# Patient Record
Sex: FEMALE | Race: WHITE | HISPANIC OR LATINO | URBAN - METROPOLITAN AREA
[De-identification: names, ages, dates, MRNs, and addresses within clinical notes are randomized per-mention and may not be internally consistent; named-entity substitution may affect disease eponyms.]

---

## 2022-04-18 ENCOUNTER — OFFICE (OUTPATIENT)
Dept: URBAN - METROPOLITAN AREA CLINIC 79 | Facility: CLINIC | Age: 32
End: 2022-04-18

## 2022-04-18 VITALS
SYSTOLIC BLOOD PRESSURE: 106 MMHG | WEIGHT: 176 LBS | DIASTOLIC BLOOD PRESSURE: 68 MMHG | TEMPERATURE: 97.6 F | HEART RATE: 89 BPM

## 2022-04-18 DIAGNOSIS — R19.8 OTHER SPECIFIED SYMPTOMS AND SIGNS INVOLVING THE DIGESTIVE S: ICD-10-CM

## 2022-04-18 PROCEDURE — 99204 OFFICE O/P NEW MOD 45 MIN: CPT | Performed by: INTERNAL MEDICINE

## 2022-07-21 ENCOUNTER — TELEHEALTH PROVIDED IN PATIENT'S HOME (OUTPATIENT)
Dept: URBAN - METROPOLITAN AREA TELEHEALTH 3 | Facility: TELEHEALTH | Age: 32
End: 2022-07-21

## 2022-07-21 VITALS — WEIGHT: 160 LBS | HEIGHT: 64 IN

## 2022-07-21 DIAGNOSIS — R19.8 OTHER SPECIFIED SYMPTOMS AND SIGNS INVOLVING THE DIGESTIVE S: ICD-10-CM

## 2022-07-21 PROCEDURE — 99214 OFFICE O/P EST MOD 30 MIN: CPT | Mod: 95 | Performed by: INTERNAL MEDICINE

## 2022-07-21 NOTE — SERVICEHPINOTES
PATIENT VERIFIED BY DATE OF BIRTH AND NAME. Patient has been consented for this telecommunication visit.
br 
Last visit 4/18 for irregular BMs. Labs notable for elevated CRP, normal ESR, celiac panel and fecal calp
brShe continues to have occasional bloating diarrhea, triggered with stress and certain foods such as cashew butter
brRemoval of dairy from her diet has helped. Otherwise doing somewhat better from last visit.
br
anthony 
Fam hx: bother with Crohn's dx at age 32br
Yolanda 10 point review of systems have been reviewed as per HPI and otherwise negative. br
br